# Patient Record
Sex: FEMALE | Race: WHITE | NOT HISPANIC OR LATINO | URBAN - METROPOLITAN AREA
[De-identification: names, ages, dates, MRNs, and addresses within clinical notes are randomized per-mention and may not be internally consistent; named-entity substitution may affect disease eponyms.]

---

## 2020-10-09 ENCOUNTER — OUTPATIENT (OUTPATIENT)
Dept: OUTPATIENT SERVICES | Facility: HOSPITAL | Age: 45
LOS: 1 days | Discharge: HOME | End: 2020-10-09
Payer: COMMERCIAL

## 2020-10-09 VITALS
OXYGEN SATURATION: 100 % | HEIGHT: 68 IN | TEMPERATURE: 100 F | WEIGHT: 185.19 LBS | HEART RATE: 62 BPM | SYSTOLIC BLOOD PRESSURE: 121 MMHG | DIASTOLIC BLOOD PRESSURE: 76 MMHG | RESPIRATION RATE: 20 BRPM

## 2020-10-09 DIAGNOSIS — Z98.890 OTHER SPECIFIED POSTPROCEDURAL STATES: Chronic | ICD-10-CM

## 2020-10-09 DIAGNOSIS — Z90.89 ACQUIRED ABSENCE OF OTHER ORGANS: Chronic | ICD-10-CM

## 2020-10-09 DIAGNOSIS — Z01.818 ENCOUNTER FOR OTHER PREPROCEDURAL EXAMINATION: ICD-10-CM

## 2020-10-09 DIAGNOSIS — L98.7 EXCESSIVE AND REDUNDANT SKIN AND SUBCUTANEOUS TISSUE: ICD-10-CM

## 2020-10-09 DIAGNOSIS — Z98.891 HISTORY OF UTERINE SCAR FROM PREVIOUS SURGERY: Chronic | ICD-10-CM

## 2020-10-09 LAB
ALBUMIN SERPL ELPH-MCNC: 4.5 G/DL — SIGNIFICANT CHANGE UP (ref 3.5–5.2)
ALP SERPL-CCNC: 70 U/L — SIGNIFICANT CHANGE UP (ref 30–115)
ALT FLD-CCNC: 12 U/L — SIGNIFICANT CHANGE UP (ref 0–41)
ANION GAP SERPL CALC-SCNC: 9 MMOL/L — SIGNIFICANT CHANGE UP (ref 7–14)
APTT BLD: 34.4 SEC — SIGNIFICANT CHANGE UP (ref 27–39.2)
AST SERPL-CCNC: 12 U/L — SIGNIFICANT CHANGE UP (ref 0–41)
BASOPHILS # BLD AUTO: 0.02 K/UL — SIGNIFICANT CHANGE UP (ref 0–0.2)
BASOPHILS NFR BLD AUTO: 0.2 % — SIGNIFICANT CHANGE UP (ref 0–1)
BILIRUB SERPL-MCNC: 0.5 MG/DL — SIGNIFICANT CHANGE UP (ref 0.2–1.2)
BUN SERPL-MCNC: 13 MG/DL — SIGNIFICANT CHANGE UP (ref 10–20)
CALCIUM SERPL-MCNC: 9.5 MG/DL — SIGNIFICANT CHANGE UP (ref 8.5–10.1)
CHLORIDE SERPL-SCNC: 102 MMOL/L — SIGNIFICANT CHANGE UP (ref 98–110)
CO2 SERPL-SCNC: 24 MMOL/L — SIGNIFICANT CHANGE UP (ref 17–32)
CREAT SERPL-MCNC: 0.8 MG/DL — SIGNIFICANT CHANGE UP (ref 0.7–1.5)
EOSINOPHIL # BLD AUTO: 0.3 K/UL — SIGNIFICANT CHANGE UP (ref 0–0.7)
EOSINOPHIL NFR BLD AUTO: 3.6 % — SIGNIFICANT CHANGE UP (ref 0–8)
GLUCOSE SERPL-MCNC: 92 MG/DL — SIGNIFICANT CHANGE UP (ref 70–99)
HCT VFR BLD CALC: 41.2 % — SIGNIFICANT CHANGE UP (ref 37–47)
HGB BLD-MCNC: 12.9 G/DL — SIGNIFICANT CHANGE UP (ref 12–16)
IMM GRANULOCYTES NFR BLD AUTO: 0.2 % — SIGNIFICANT CHANGE UP (ref 0.1–0.3)
INR BLD: 1.09 RATIO — SIGNIFICANT CHANGE UP (ref 0.65–1.3)
LYMPHOCYTES # BLD AUTO: 2.65 K/UL — SIGNIFICANT CHANGE UP (ref 1.2–3.4)
LYMPHOCYTES # BLD AUTO: 32.1 % — SIGNIFICANT CHANGE UP (ref 20.5–51.1)
MCHC RBC-ENTMCNC: 27.7 PG — SIGNIFICANT CHANGE UP (ref 27–31)
MCHC RBC-ENTMCNC: 31.3 G/DL — LOW (ref 32–37)
MCV RBC AUTO: 88.4 FL — SIGNIFICANT CHANGE UP (ref 81–99)
MONOCYTES # BLD AUTO: 0.6 K/UL — SIGNIFICANT CHANGE UP (ref 0.1–0.6)
MONOCYTES NFR BLD AUTO: 7.3 % — SIGNIFICANT CHANGE UP (ref 1.7–9.3)
NEUTROPHILS # BLD AUTO: 4.67 K/UL — SIGNIFICANT CHANGE UP (ref 1.4–6.5)
NEUTROPHILS NFR BLD AUTO: 56.6 % — SIGNIFICANT CHANGE UP (ref 42.2–75.2)
NRBC # BLD: 0 /100 WBCS — SIGNIFICANT CHANGE UP (ref 0–0)
PLATELET # BLD AUTO: 303 K/UL — SIGNIFICANT CHANGE UP (ref 130–400)
POTASSIUM SERPL-MCNC: 4.6 MMOL/L — SIGNIFICANT CHANGE UP (ref 3.5–5)
POTASSIUM SERPL-SCNC: 4.6 MMOL/L — SIGNIFICANT CHANGE UP (ref 3.5–5)
PROT SERPL-MCNC: 6.8 G/DL — SIGNIFICANT CHANGE UP (ref 6–8)
PROTHROM AB SERPL-ACNC: 12.5 SEC — SIGNIFICANT CHANGE UP (ref 9.95–12.87)
RBC # BLD: 4.66 M/UL — SIGNIFICANT CHANGE UP (ref 4.2–5.4)
RBC # FLD: 13.8 % — SIGNIFICANT CHANGE UP (ref 11.5–14.5)
SODIUM SERPL-SCNC: 135 MMOL/L — SIGNIFICANT CHANGE UP (ref 135–146)
WBC # BLD: 8.26 K/UL — SIGNIFICANT CHANGE UP (ref 4.8–10.8)
WBC # FLD AUTO: 8.26 K/UL — SIGNIFICANT CHANGE UP (ref 4.8–10.8)

## 2020-10-09 PROCEDURE — 93010 ELECTROCARDIOGRAM REPORT: CPT

## 2020-10-09 NOTE — H&P PST ADULT - NSICDXPASTSURGICALHX_GEN_ALL_CORE_FT
PAST SURGICAL HISTORY:  H/O myomectomy     H/O rhinoplasty ;    History of medical termination of pregnancy     S/P  ;    S/P tonsillectomy

## 2020-10-09 NOTE — H&P PST ADULT - DOES THIS PATIENT HAVE A HISTORY OF OR HAS BEEN DX WITH HEART FAILURE?
PROCEDURE NOTE: Lucentis 0.5mg PFS OS. Diagnosis: Neovascular AMD with Active CNV. Prior to injection, risks/benefits/alternatives discussed including but not limited to infection, loss of vision or eye, hemorrhage, cataract, glaucoma, retinal tears or detachment. The patient wished to proceed with treatment. Topical anesthesia was induced with Alcaine. Additional anesthesia was achieved using drop(s) or injection checked above. A drop of Povidone-iodine 5% ophthalmic solution was instilled over the injection site and in the inferior fornix. Betadine prep was performed. A single use prefilled syringe of intravitreal Lucentis 0.5mg/0.05ml was used and excess was disposed of as waste. The needle was passed 3.0 mm posterior to the limbus in pseudophakic patients, and 3.5 mm posterior to the limbus in phakic patients. Injection Time *. Patient tolerated the procedure well. There were no complications. The eye was irrigated with sterile irrigating solution. Post procedure instructions given. The patient was instructed to use Artificial Tears q.i.d. p.r.n for comfort. The patient was instructed to return for re-evaluation in approximately 4-12 weeks depending on his/her condition and was told to call immediately if vision decreases and/or if his/her eye becomes red, painful, and/or light sensitive. The patient was instructed to go to the emergency room or call 911 if unable to reach the doctor within an hour or two of trying or calling. January Sequeira
no

## 2020-10-09 NOTE — H&P PST ADULT - HISTORY OF PRESENT ILLNESS
45 y/o female scheduled for elective procedure bilateral thigh lift  reports no c/o cp,sob,palpitations,cough or dysuria  1-2 fos without sob    denies any exposure to covid-19 advised to self quarantine    Anesthesia Alert  NO--Difficult Airway  NO--History of neck surgery or radiation  NO--Limited ROM of neck  NO--History of Malignant hyperthermia  NO--Personal or family history of Pseudocholinesterase deficiency  NO--Prior Anesthesia Complication  NO--Latex Allergy  NO--Loose teeth  NO--History of Rheumatoid Arthritis  NO--LIZZIE  NO--Other_____

## 2020-10-22 NOTE — ASU PATIENT PROFILE, ADULT - PSH
H/O myomectomy    H/O rhinoplasty  ;  History of medical termination of pregnancy    S/P   ;  S/P tonsillectomy

## 2020-10-22 NOTE — ASU PATIENT PROFILE, ADULT - CAREGIVER PHONE NUMBER
"  History     Chief Complaint   Patient presents with     Abdominal Pain     noted L lower quadrant pain around 11 PM last night upon urinating. Pt denies hematuria/dysuria. Pt stated that she this way when her blood sugar is high. Newly dx Type1 DM \"I only knew about it 2 days ago\" Is on insulin     HPI  Fabio Lacey is a 17 year old female who has medical history of recently diagnosed diabetes.  She has been taking her medications as prescribed.  She was recently admitted to [facility] with similar presentation, she had extensive evaluation of her abdominal pain which was described as being due to menstruation.  Patient currently is having some abdominal pain with urination in addition to some shortness of breath.  Denies chest pain.  No headache, neck stiffness, fevers or chills.  No vomiting or diarrhea.  No alcohol or drug use.  No leg pain or leg swelling.  No recent travel.  No oral contraceptive pills, surgeries or cancer history.  She has said that her hands are sweaty which is new for her since being diagnosed diabetes.    I have reviewed the Medications, Allergies, Past Medical and Surgical History, and Social History in the Epic system.    Review of Systems   Constitutional: Negative for chills, fatigue and fever.   HENT: Negative for sore throat and trouble swallowing.    Eyes: Negative for visual disturbance.   Respiratory: Positive for shortness of breath. Negative for cough and chest tightness.    Cardiovascular: Negative for chest pain, palpitations and leg swelling.   Gastrointestinal: Positive for abdominal pain. Negative for diarrhea, nausea and vomiting.   Endocrine: Negative for polydipsia.   Genitourinary: Positive for dysuria and vaginal bleeding (Having menstrual cycle). Negative for flank pain, frequency and vaginal discharge.   Musculoskeletal: Negative for back pain, myalgias, neck pain and neck stiffness.   Skin: Negative for rash.   Neurological: Negative for " light-headedness and headaches.   Psychiatric/Behavioral: Negative for self-injury. The patient is nervous/anxious.        Physical Exam   BP: 125/57  Pulse: 75  Temp: 97.5  F (36.4  C)  Resp: 16  Weight: 59.4 kg (131 lb)  SpO2: 99 %      Physical Exam  Physical Exam   Constitutional: oriented to person, place, and time. appears well-developed and well-nourished.   HENT:   Head: Normocephalic and atraumatic.   Neck: Normal range of motion.   Pulmonary/Chest: Effort normal. No respiratory distress.   Cardiac: No murmurs, rubs, gallops. RRR.  Abdominal: Abdomen soft, nontender, nondistended. No rebound tenderness. No CVA TTP.  MSK: Long bones without deformity or evidence of trauma  Neurological: alert and oriented to person, place, and time.   Skin: Skin is warm and dry.   Psychiatric:  normal mood and affect.  behavior is normal. Thought content normal.     ED Course        Procedures        Results for orders placed or performed during the hospital encounter of 01/16/19   XR Chest 2 Views    Narrative    XR CHEST 2 VW   1/16/2019 3:39 AM     HISTORY: Shortness of breath.    COMPARISON: None.    FINDINGS: The heart size is normal. The lungs are clear. No  pneumothorax or pleural effusion.      Impression    IMPRESSION: No acute abnormality.    AMEE BAUMANN MD   UA reflex to Microscopic and Culture   Result Value Ref Range    Color Urine Light Yellow     Appearance Urine Clear     Glucose Urine Negative NEG^Negative mg/dL    Bilirubin Urine Negative NEG^Negative    Ketones Urine Negative NEG^Negative mg/dL    Specific Gravity Urine 1.010 1.003 - 1.035    Blood Urine Negative NEG^Negative    pH Urine 6.0 5.0 - 7.0 pH    Protein Albumin Urine Negative NEG^Negative mg/dL    Urobilinogen mg/dL Normal 0.0 - 2.0 mg/dL    Nitrite Urine Negative NEG^Negative    Leukocyte Esterase Urine Negative NEG^Negative    Source Midstream Urine     RBC Urine 0 0 - 2 /HPF    WBC Urine 1 0 - 5 /HPF    Bacteria Urine Few (A)  NEG^Negative /HPF    Mucous Urine Present (A) NEG^Negative /LPF   HCG qualitative urine (UPT)   Result Value Ref Range    HCG Qual Urine Negative NEG^Negative   Glucose by meter   Result Value Ref Range    Glucose 76 70 - 99 mg/dL   CBC with platelets differential   Result Value Ref Range    WBC 11.8 (H) 4.0 - 11.0 10e9/L    RBC Count 4.62 3.8 - 5.2 10e12/L    Hemoglobin 12.6 11.7 - 15.7 g/dL    Hematocrit 38.9 35.0 - 47.0 %    MCV 84 78 - 100 fl    MCH 27.3 26.5 - 33.0 pg    MCHC 32.4 31.5 - 36.5 g/dL    RDW 15.0 10.0 - 15.0 %    Platelet Count 312 150 - 450 10e9/L    Diff Method Automated Method     % Neutrophils 55.7 %    % Lymphocytes 34.5 %    % Monocytes 5.6 %    % Eosinophils 3.4 %    % Basophils 0.5 %    % Immature Granulocytes 0.3 %    Nucleated RBCs 0 0 /100    Absolute Neutrophil 6.6 1.6 - 8.3 10e9/L    Absolute Lymphocytes 4.1 0.8 - 5.3 10e9/L    Absolute Monocytes 0.7 0.0 - 1.3 10e9/L    Absolute Eosinophils 0.4 0.0 - 0.7 10e9/L    Absolute Basophils 0.1 0.0 - 0.2 10e9/L    Abs Immature Granulocytes 0.0 0 - 0.4 10e9/L    Absolute Nucleated RBC 0.0    Comprehensive metabolic panel   Result Value Ref Range    Sodium 136 133 - 144 mmol/L    Potassium 3.4 3.4 - 5.3 mmol/L    Chloride 104 96 - 110 mmol/L    Carbon Dioxide 25 20 - 32 mmol/L    Anion Gap 7 3 - 14 mmol/L    Glucose 142 (H) 70 - 99 mg/dL    Urea Nitrogen 13 7 - 19 mg/dL    Creatinine 0.58 0.50 - 1.00 mg/dL    GFR Estimate >90 >60 mL/min/[1.73_m2]    GFR Estimate If Black >90 >60 mL/min/[1.73_m2]    Calcium 8.1 (L) 9.1 - 10.3 mg/dL    Bilirubin Total 0.5 0.2 - 1.3 mg/dL    Albumin 3.9 3.4 - 5.0 g/dL    Protein Total 7.3 6.8 - 8.8 g/dL    Alkaline Phosphatase 85 40 - 150 U/L    ALT 25 0 - 50 U/L    AST 23 0 - 35 U/L   Lipase   Result Value Ref Range    Lipase 67 0 - 194 U/L   Glucose by meter   Result Value Ref Range    Glucose 147 (H) 70 - 99 mg/dL            Labs Ordered and Resulted from Time of ED Arrival Up to the Time of Departure from the  ED   GLUCOSE BY METER   UA MACROSCOPIC WITH REFLEX TO MICRO AND CULTURE   HCG QUALITATIVE URINE            Assessments & Plan (with Medical Decision Making)   MDM  Patient presenting with multiple symptoms including shortness of breath.  I do not suspect pulmonary embolism, low risk by Wells a negative PERC so will not workup.  Will do chest x-ray to assess for pneumothorax, pneumonia or other acute pathology.  Unlikely cardiac etiology due to lack of chest pain, Personal or family history.  Blood sugar here is normal, she was given juice.  Low suspicion for colonic pathology due to lack of pain on examination, no vaginal discharge.  She recently did have extensive workup including pelvic ultrasound and labs done at outside hospital all of which were unremarkable for abdominal or pelvic pathology..    Re eval: Repeat examination shows benign abdomen without significant pain.  White blood count is mildly elevated at 11.8 however this is nonspecific.  No abdominal pain to suggest PID, TOA or right lower quadrant tenderness to suggest appendicitis will defer imaging at this point. Labs otherwise are largely unremarkable, no acidemia.  Blood sugar is normal.  No glucosuria.  No evidence of liver or pancreas dysfunction.  Chest x-ray also shows no acute pathology.  Patient does feel better and she is reassured.  Encouraged patient that she is doing very well with her insulin treatment and disease however she is managing quite well.  Patient is going to follow-up soon with her primary care provider.  She will return if symptoms are worsening or if she has had a concerns.  I not feel that further workup is needed at this point.    I have reviewed the nursing notes.    I have reviewed the findings, diagnosis, plan and need for follow up with the patient.       Medication List      There are no discharge medications for this visit.         Final diagnoses:   Abdominal pain, generalized       1/16/2019   [facility]     Nader Mendoza MD  01/16/19 [id number]     [phone]

## 2020-10-23 ENCOUNTER — OUTPATIENT (OUTPATIENT)
Dept: OUTPATIENT SERVICES | Facility: HOSPITAL | Age: 45
LOS: 1 days | Discharge: HOME | End: 2020-10-23

## 2020-10-23 VITALS
DIASTOLIC BLOOD PRESSURE: 66 MMHG | OXYGEN SATURATION: 99 % | SYSTOLIC BLOOD PRESSURE: 112 MMHG | RESPIRATION RATE: 18 BRPM | HEART RATE: 78 BPM

## 2020-10-23 VITALS
SYSTOLIC BLOOD PRESSURE: 120 MMHG | HEART RATE: 73 BPM | RESPIRATION RATE: 17 BRPM | HEIGHT: 68 IN | WEIGHT: 184.97 LBS | TEMPERATURE: 98 F | OXYGEN SATURATION: 98 % | DIASTOLIC BLOOD PRESSURE: 91 MMHG

## 2020-10-23 DIAGNOSIS — Z98.890 OTHER SPECIFIED POSTPROCEDURAL STATES: Chronic | ICD-10-CM

## 2020-10-23 DIAGNOSIS — Z90.89 ACQUIRED ABSENCE OF OTHER ORGANS: Chronic | ICD-10-CM

## 2020-10-23 DIAGNOSIS — Z98.891 HISTORY OF UTERINE SCAR FROM PREVIOUS SURGERY: Chronic | ICD-10-CM

## 2020-10-23 RX ORDER — ONDANSETRON 8 MG/1
4 TABLET, FILM COATED ORAL ONCE
Refills: 0 | Status: COMPLETED | OUTPATIENT
Start: 2020-10-23 | End: 2020-10-23

## 2020-10-23 RX ORDER — SIMETHICONE 80 MG/1
1 TABLET, CHEWABLE ORAL
Qty: 0 | Refills: 0 | DISCHARGE

## 2020-10-23 RX ORDER — HYDROMORPHONE HYDROCHLORIDE 2 MG/ML
1 INJECTION INTRAMUSCULAR; INTRAVENOUS; SUBCUTANEOUS
Refills: 0 | Status: DISCONTINUED | OUTPATIENT
Start: 2020-10-23 | End: 2020-10-23

## 2020-10-23 RX ORDER — SODIUM CHLORIDE 9 MG/ML
1000 INJECTION, SOLUTION INTRAVENOUS
Refills: 0 | Status: DISCONTINUED | OUTPATIENT
Start: 2020-10-23 | End: 2020-10-24

## 2020-10-23 RX ORDER — OXYCODONE AND ACETAMINOPHEN 5; 325 MG/1; MG/1
1 TABLET ORAL ONCE
Refills: 0 | Status: DISCONTINUED | OUTPATIENT
Start: 2020-10-23 | End: 2020-10-23

## 2020-10-23 RX ORDER — HYDROMORPHONE HYDROCHLORIDE 2 MG/ML
0.5 INJECTION INTRAMUSCULAR; INTRAVENOUS; SUBCUTANEOUS
Refills: 0 | Status: DISCONTINUED | OUTPATIENT
Start: 2020-10-23 | End: 2020-10-24

## 2020-10-23 RX ADMIN — HYDROMORPHONE HYDROCHLORIDE 1 MILLIGRAM(S): 2 INJECTION INTRAMUSCULAR; INTRAVENOUS; SUBCUTANEOUS at 18:23

## 2020-10-23 RX ADMIN — SODIUM CHLORIDE 75 MILLILITER(S): 9 INJECTION, SOLUTION INTRAVENOUS at 18:05

## 2020-10-23 RX ADMIN — ONDANSETRON 4 MILLIGRAM(S): 8 TABLET, FILM COATED ORAL at 18:48

## 2020-10-23 RX ADMIN — OXYCODONE AND ACETAMINOPHEN 1 TABLET(S): 5; 325 TABLET ORAL at 19:29

## 2020-10-23 NOTE — PRE-ANESTHESIA EVALUATION ADULT - NSATTENDATTESTRD_GEN_ALL_CORE
No
The patient has been re-examined and I agree with the above assessment or I updated with my findings.

## 2020-10-23 NOTE — PRE-ANESTHESIA EVALUATION ADULT - WEIGHT IN KG
Patient is aware of results and recommendations below and agreeable, orders placed in chart and patient transferred to .   83.9

## 2020-10-23 NOTE — ASU DISCHARGE PLAN (ADULT/PEDIATRIC) - ASU DC SPECIAL INSTRUCTIONSFT
Take pain medications and antibiotics as prescribed by Dr. Medina.   Leave dressings in place.   You may sponge bath. Do not shower until cleared by Dr. Medina.   No vigorous activity or heavy lifting over 10 pounds.  Empty and record drain output and color at least once a day.   Follow up with Dr. Medina as scheduled.

## 2020-10-23 NOTE — ASU DISCHARGE PLAN (ADULT/PEDIATRIC) - CALL YOUR DOCTOR IF YOU HAVE ANY OF THE FOLLOWING:
Fever greater than (need to indicate Fahrenheit or Celsius)/Pain not relieved by Medications/Wound/Surgical Site with redness, or foul smelling discharge or pus/Bleeding that does not stop/Swelling that gets worse

## 2020-10-23 NOTE — ASU DISCHARGE PLAN (ADULT/PEDIATRIC) - CARE PROVIDER_API CALL
Aydin Medina  Plastic Surgery  [address] [place] [address], [address] [place] [address]  Phone: [phone]  Fax: [phone]  Follow Up Time: 1-3 days

## 2020-10-29 DIAGNOSIS — Z88.0 ALLERGY STATUS TO PENICILLIN: ICD-10-CM

## 2020-10-29 DIAGNOSIS — L98.7 EXCESSIVE AND REDUNDANT SKIN AND SUBCUTANEOUS TISSUE: ICD-10-CM
